# Patient Record
Sex: MALE | Race: BLACK OR AFRICAN AMERICAN | Employment: OTHER | URBAN - METROPOLITAN AREA
[De-identification: names, ages, dates, MRNs, and addresses within clinical notes are randomized per-mention and may not be internally consistent; named-entity substitution may affect disease eponyms.]

---

## 2017-10-03 PROBLEM — I99.9 VASCULOPATHY: Status: ACTIVE | Noted: 2017-10-03

## 2017-10-03 PROBLEM — N52.9 ERECTILE DYSFUNCTION: Status: ACTIVE | Noted: 2017-10-03

## 2017-10-03 PROBLEM — I10 ESSENTIAL HYPERTENSION: Status: ACTIVE | Noted: 2017-10-03

## 2017-10-03 PROBLEM — R31.9 HEMATURIA: Status: ACTIVE | Noted: 2017-10-03

## 2017-10-03 PROBLEM — I82.409 DVT (DEEP VENOUS THROMBOSIS) (HCC): Status: ACTIVE | Noted: 2017-10-03

## 2017-10-03 PROBLEM — Z95.828 S/P IVC FILTER: Status: ACTIVE | Noted: 2017-10-03

## 2017-10-03 PROBLEM — T30.4 BURNS BY, CHEMICAL: Status: ACTIVE | Noted: 2017-10-03

## 2019-12-05 NOTE — H&P (VIEW-ONLY)
Alexander Young MD, [address] [facility], [address] [place] Phone [phone]   Fax [phone] Date of visit: 12/5/2019 Primary/Requesting provider: Judson Browning MD 
 
Chief Complaint Patient presents with  New Patient  
  left inguinal hernia HISTORY OF PRESENT ILLNESS Ravi Calderon is a 71 y.o. male. HPI Patient is a 71 y.o. male who presents for evaluation of a hernia. He noted this hernia about 5+ months ago. He denies any tenderness in the groin, but notes a definite bulge. This seems to be reducible when he lays down, and worsens/enlarges with increasing activity. He denies nausea, vomiting, alteration of bowel function, or any urinary symptoms. He is very interested in repair. Medications:  
Current Outpatient Medications Medication Sig  
 amLODIPine (NORVASC) 10 mg tablet TAKE ONE TABLET BY MOUTH AT BEDTIME  multivitamin (ONE A DAY) tablet Take 1 Tab by mouth daily.  ferrous sulfate (IRON) 325 mg (65 mg iron) tablet Take  by mouth Daily (before breakfast).  bisoprolol-hydroCHLOROthiazide (ZIAC) 5-6.25 mg per tablet Take 1 Tab by mouth daily. No current facility-administered medications for this visit. Allergies: No Known Allergies Past History: 
Past Medical History:  
Diagnosis Date  Anemia  Hypertension  Vitamin D deficiency Past Surgical History:  
Procedure Laterality Date  HX COLONOSCOPY    
 HX FREE SKIN GRAFT    
 HX HERNIA REPAIR Family and Social History: 
Family History Problem Relation Age of Onset  No Known Problems Mother  Diabetes Father  Hypertension Father Social History Socioeconomic History  Marital status: SINGLE Spouse name: Not on file  Number of children: Not on file  Years of education: Not on file  Highest education level: Not on file Occupational History  Not on file Social Needs  Financial resource strain: Not on file  Food insecurity:  
  Worry: Not on file Inability: Not on file  Transportation needs:  
  Medical: Not on file Non-medical: Not on file Tobacco Use  Smoking status: Former Smoker Last attempt to quit: 10/3/1981 Years since quittin.1  Smokeless tobacco: Never Used Substance and Sexual Activity  Alcohol use: Not on file  Drug use: No  
 Sexual activity: Not on file Lifestyle  Physical activity:  
  Days per week: Not on file Minutes per session: Not on file  Stress: Not on file Relationships  Social connections:  
  Talks on phone: Not on file Gets together: Not on file Attends [employer] service: Not on file Active member of club or organization: Not on file Attends meetings of clubs or organizations: Not on file Relationship status: Not on file  Intimate partner violence:  
  Fear of current or ex partner: Not on file Emotionally abused: Not on file Physically abused: Not on file Forced sexual activity: Not on file Other Topics Concern  Not on file Social History Narrative  Not on file Review of Systems Constitutional: Negative. HENT: Negative. Eyes: Negative. Respiratory: Negative. Cardiovascular: Negative. Gastrointestinal: Negative. Genitourinary: Negative. Musculoskeletal: Negative. Skin:  
     Left inguinal hernia Neurological: Negative. Endo/Heme/Allergies: Negative. Psychiatric/Behavioral: Negative. Physical Exam 
Vitals signs and nursing note reviewed. Constitutional:   
   General: He is not in acute distress. Appearance: Normal appearance. He is well-developed. He is not toxic-appearing or diaphoretic. HENT:  
   Head: Normocephalic and atraumatic. Eyes:  
   General: No scleral icterus. Conjunctiva/sclera: Conjunctivae normal.  
   Pupils: Pupils are equal, round, and reactive to light. Neck: Musculoskeletal: Normal range of motion. Thyroid: No thyromegaly. Vascular: No JVD. Trachea: No tracheal deviation. Cardiovascular:  
   Rate and Rhythm: Normal rate and regular rhythm. Heart sounds: No murmur. No friction rub. No gallop. Pulmonary:  
   Effort: Pulmonary effort is normal. No respiratory distress. Breath sounds: Normal breath sounds. No wheezing or rales. Abdominal:  
   General: Abdomen is flat. Bowel sounds are normal. There is no distension. Palpations: Abdomen is soft. There is no mass. Hernia: A hernia is present. Hernia is present in the left inguinal area. There is no hernia in the right inguinal area. Genitourinary: 
   Comments: [facility] Musculoskeletal:  
   Comments: No gross deformities Skin: 
   General: Skin is warm. Neurological:  
   Mental Status: He is alert. Cranial Nerves: No cranial nerve deficit. Psychiatric:     
   Mood and Affect: Mood normal.     
   Behavior: Behavior normal. Behavior is cooperative. Thought Content: Thought content normal.  
 
reducible Left inguinal hernia, No RIH 
 
ASSESSMENT and PLAN Encounter Diagnoses Name Primary?  Left inguinal hernia Yes Discussed risk of incarceration/strangulation event in inguinal hernias, at least those identified incidentally which may not apply here, as 1% annual risk at 5 years in recent study randomizing men to surgery or observation. The same study indicated a >60% incidence of elective conversion from observation arm to hernia repair due to symptoms by 10 years. Pt desires repair. Will proceed with left inguinal hernia repair with mesh. Technical details of the procedure are reviewed. Risks reviewed include risks of anesthesia, bleeding, infection, visceral injury, persistent post-operative pain, and hernia recurrence. All questions are answered.

## 2019-12-10 RX ORDER — CEFAZOLIN SODIUM 1 G/3ML
2-3 INJECTION, POWDER, FOR SOLUTION INTRAMUSCULAR; INTRAVENOUS
Status: CANCELLED | OUTPATIENT
Start: 2019-12-10 | End: 2019-12-10

## 2019-12-11 ENCOUNTER — ANESTHESIA EVENT (OUTPATIENT)
Dept: SURGERY | Age: 69
End: 2019-12-11
Payer: MEDICARE

## 2019-12-12 ENCOUNTER — ANESTHESIA (OUTPATIENT)
Dept: SURGERY | Age: 69
End: 2019-12-12
Payer: MEDICARE

## 2019-12-12 ENCOUNTER — HOSPITAL ENCOUNTER (OUTPATIENT)
Age: 69
Setting detail: OUTPATIENT SURGERY
Discharge: HOME | End: 2019-12-12
Attending: SURGERY | Admitting: SURGERY
Payer: MEDICARE

## 2019-12-12 VITALS
HEART RATE: 83 BPM | DIASTOLIC BLOOD PRESSURE: 75 MMHG | TEMPERATURE: 98.2 F | SYSTOLIC BLOOD PRESSURE: 148 MMHG | HEIGHT: 69 IN | BODY MASS INDEX: 33.41 KG/M2 | WEIGHT: 225.6 LBS | OXYGEN SATURATION: 95 % | RESPIRATION RATE: 20 BRPM

## 2019-12-12 LAB — POTASSIUM BLD-SCNC: 3.2 MMOL/L (ref 3.5–5.1)

## 2019-12-12 PROCEDURE — 74011000250 HC RX REV CODE- 250: Performed by: NURSE ANESTHETIST, CERTIFIED REGISTERED

## 2019-12-12 PROCEDURE — 74011000250 HC RX REV CODE- 250: Performed by: SURGERY

## 2019-12-12 PROCEDURE — 77030018836 HC SOL IRR NACL ICUM -A: Performed by: SURGERY

## 2019-12-12 PROCEDURE — 77030012411 HC DRN WND CARD -A: Performed by: SURGERY

## 2019-12-12 PROCEDURE — 77030037088 HC TUBE ENDOTRACH ORAL NSL COVD-A: Performed by: ANESTHESIOLOGY

## 2019-12-12 PROCEDURE — 77030002996 HC SUT SLK J&J -A: Performed by: SURGERY

## 2019-12-12 PROCEDURE — C1781 MESH (IMPLANTABLE): HCPCS | Performed by: SURGERY

## 2019-12-12 PROCEDURE — 76010000161 HC OR TIME 1 TO 1.5 HR INTENSV-TIER 1: Performed by: SURGERY

## 2019-12-12 PROCEDURE — 76210000020 HC REC RM PH II FIRST 0.5 HR: Performed by: SURGERY

## 2019-12-12 PROCEDURE — 76210000006 HC OR PH I REC 0.5 TO 1 HR: Performed by: SURGERY

## 2019-12-12 PROCEDURE — 77030039425 HC BLD LARYNG TRULITE DISP TELE -A: Performed by: ANESTHESIOLOGY

## 2019-12-12 PROCEDURE — 77030019908 HC STETH ESOPH SIMS -A: Performed by: ANESTHESIOLOGY

## 2019-12-12 PROCEDURE — 77030040922 HC BLNKT HYPOTHRM STRY -A: Performed by: ANESTHESIOLOGY

## 2019-12-12 PROCEDURE — 74011250636 HC RX REV CODE- 250/636: Performed by: SURGERY

## 2019-12-12 PROCEDURE — 77030002982 HC SUT POLYSRB J&J -A: Performed by: SURGERY

## 2019-12-12 PROCEDURE — 84132 ASSAY OF SERUM POTASSIUM: CPT

## 2019-12-12 PROCEDURE — 74011250637 HC RX REV CODE- 250/637: Performed by: ANESTHESIOLOGY

## 2019-12-12 PROCEDURE — 77030002986 HC SUT PROL J&J -A: Performed by: SURGERY

## 2019-12-12 PROCEDURE — 77030031139 HC SUT VCRL2 J&J -A: Performed by: SURGERY

## 2019-12-12 PROCEDURE — 76060000033 HC ANESTHESIA 1 TO 1.5 HR: Performed by: SURGERY

## 2019-12-12 PROCEDURE — 77030040361 HC SLV COMPR DVT MDII -B: Performed by: SURGERY

## 2019-12-12 PROCEDURE — 74011250636 HC RX REV CODE- 250/636: Performed by: ANESTHESIOLOGY

## 2019-12-12 PROCEDURE — 74011250636 HC RX REV CODE- 250/636: Performed by: NURSE ANESTHETIST, CERTIFIED REGISTERED

## 2019-12-12 DEVICE — MESH HERN PLG PERFIX MED --: Type: IMPLANTABLE DEVICE | Site: INGUINAL | Status: FUNCTIONAL

## 2019-12-12 RX ORDER — OXYCODONE HYDROCHLORIDE 5 MG/1
5 TABLET ORAL
Status: DISCONTINUED | OUTPATIENT
Start: 2019-12-12 | End: 2019-12-12 | Stop reason: HOSPADM

## 2019-12-12 RX ORDER — NEOSTIGMINE METHYLSULFATE 1 MG/ML
INJECTION, SOLUTION INTRAVENOUS AS NEEDED
Status: DISCONTINUED | OUTPATIENT
Start: 2019-12-12 | End: 2019-12-12 | Stop reason: HOSPADM

## 2019-12-12 RX ORDER — EPHEDRINE SULFATE/0.9% NACL/PF 50 MG/5 ML
SYRINGE (ML) INTRAVENOUS AS NEEDED
Status: DISCONTINUED | OUTPATIENT
Start: 2019-12-12 | End: 2019-12-12 | Stop reason: HOSPADM

## 2019-12-12 RX ORDER — CEFAZOLIN SODIUM/WATER 2 G/20 ML
2 SYRINGE (ML) INTRAVENOUS
Status: COMPLETED | OUTPATIENT
Start: 2019-12-12 | End: 2019-12-12

## 2019-12-12 RX ORDER — GLYCOPYRROLATE 0.2 MG/ML
INJECTION INTRAMUSCULAR; INTRAVENOUS AS NEEDED
Status: DISCONTINUED | OUTPATIENT
Start: 2019-12-12 | End: 2019-12-12 | Stop reason: HOSPADM

## 2019-12-12 RX ORDER — MIDAZOLAM HYDROCHLORIDE 1 MG/ML
2 INJECTION, SOLUTION INTRAMUSCULAR; INTRAVENOUS
Status: DISCONTINUED | OUTPATIENT
Start: 2019-12-12 | End: 2019-12-12 | Stop reason: HOSPADM

## 2019-12-12 RX ORDER — LIDOCAINE HYDROCHLORIDE 20 MG/ML
INJECTION, SOLUTION EPIDURAL; INFILTRATION; INTRACAUDAL; PERINEURAL AS NEEDED
Status: DISCONTINUED | OUTPATIENT
Start: 2019-12-12 | End: 2019-12-12 | Stop reason: HOSPADM

## 2019-12-12 RX ORDER — FENTANYL CITRATE 50 UG/ML
INJECTION, SOLUTION INTRAMUSCULAR; INTRAVENOUS AS NEEDED
Status: DISCONTINUED | OUTPATIENT
Start: 2019-12-12 | End: 2019-12-12 | Stop reason: HOSPADM

## 2019-12-12 RX ORDER — HYDROCODONE BITARTRATE AND ACETAMINOPHEN 5; 325 MG/1; MG/1
2 TABLET ORAL AS NEEDED
Status: DISCONTINUED | OUTPATIENT
Start: 2019-12-12 | End: 2019-12-12 | Stop reason: HOSPADM

## 2019-12-12 RX ORDER — FENTANYL CITRATE 50 UG/ML
100 INJECTION, SOLUTION INTRAMUSCULAR; INTRAVENOUS ONCE
Status: DISCONTINUED | OUTPATIENT
Start: 2019-12-12 | End: 2019-12-12 | Stop reason: HOSPADM

## 2019-12-12 RX ORDER — KETOROLAC TROMETHAMINE 30 MG/ML
INJECTION, SOLUTION INTRAMUSCULAR; INTRAVENOUS AS NEEDED
Status: DISCONTINUED | OUTPATIENT
Start: 2019-12-12 | End: 2019-12-12 | Stop reason: HOSPADM

## 2019-12-12 RX ORDER — PROPOFOL 10 MG/ML
INJECTION, EMULSION INTRAVENOUS AS NEEDED
Status: DISCONTINUED | OUTPATIENT
Start: 2019-12-12 | End: 2019-12-12 | Stop reason: HOSPADM

## 2019-12-12 RX ORDER — ROCURONIUM BROMIDE 10 MG/ML
INJECTION, SOLUTION INTRAVENOUS AS NEEDED
Status: DISCONTINUED | OUTPATIENT
Start: 2019-12-12 | End: 2019-12-12 | Stop reason: HOSPADM

## 2019-12-12 RX ORDER — ONDANSETRON 2 MG/ML
INJECTION INTRAMUSCULAR; INTRAVENOUS AS NEEDED
Status: DISCONTINUED | OUTPATIENT
Start: 2019-12-12 | End: 2019-12-12 | Stop reason: HOSPADM

## 2019-12-12 RX ORDER — HYDROMORPHONE HYDROCHLORIDE 2 MG/ML
0.5 INJECTION, SOLUTION INTRAMUSCULAR; INTRAVENOUS; SUBCUTANEOUS
Status: DISCONTINUED | OUTPATIENT
Start: 2019-12-12 | End: 2019-12-12 | Stop reason: HOSPADM

## 2019-12-12 RX ORDER — SODIUM CHLORIDE, SODIUM LACTATE, POTASSIUM CHLORIDE, CALCIUM CHLORIDE 600; 310; 30; 20 MG/100ML; MG/100ML; MG/100ML; MG/100ML
75 INJECTION, SOLUTION INTRAVENOUS CONTINUOUS
Status: DISCONTINUED | OUTPATIENT
Start: 2019-12-12 | End: 2019-12-12 | Stop reason: HOSPADM

## 2019-12-12 RX ORDER — LIDOCAINE HYDROCHLORIDE 10 MG/ML
0.1 INJECTION INFILTRATION; PERINEURAL AS NEEDED
Status: DISCONTINUED | OUTPATIENT
Start: 2019-12-12 | End: 2019-12-12 | Stop reason: HOSPADM

## 2019-12-12 RX ORDER — DEXAMETHASONE SODIUM PHOSPHATE 4 MG/ML
INJECTION, SOLUTION INTRA-ARTICULAR; INTRALESIONAL; INTRAMUSCULAR; INTRAVENOUS; SOFT TISSUE AS NEEDED
Status: DISCONTINUED | OUTPATIENT
Start: 2019-12-12 | End: 2019-12-12 | Stop reason: HOSPADM

## 2019-12-12 RX ORDER — BUPIVACAINE HYDROCHLORIDE AND EPINEPHRINE 2.5; 5 MG/ML; UG/ML
INJECTION, SOLUTION EPIDURAL; INFILTRATION; INTRACAUDAL; PERINEURAL AS NEEDED
Status: DISCONTINUED | OUTPATIENT
Start: 2019-12-12 | End: 2019-12-12 | Stop reason: HOSPADM

## 2019-12-12 RX ADMIN — PROPOFOL 200 MG: 10 INJECTION, EMULSION INTRAVENOUS at 16:07

## 2019-12-12 RX ADMIN — Medication 10 MG: at 16:35

## 2019-12-12 RX ADMIN — LIDOCAINE HYDROCHLORIDE 60 MG: 20 INJECTION, SOLUTION EPIDURAL; INFILTRATION; INTRACAUDAL; PERINEURAL at 16:07

## 2019-12-12 RX ADMIN — ROCURONIUM BROMIDE 40 MG: 10 INJECTION, SOLUTION INTRAVENOUS at 16:07

## 2019-12-12 RX ADMIN — FENTANYL CITRATE 100 MCG: 50 INJECTION INTRAMUSCULAR; INTRAVENOUS at 16:07

## 2019-12-12 RX ADMIN — Medication 3 MG: at 17:07

## 2019-12-12 RX ADMIN — Medication 2 G: at 16:22

## 2019-12-12 RX ADMIN — SODIUM CHLORIDE, SODIUM LACTATE, POTASSIUM CHLORIDE, AND CALCIUM CHLORIDE 75 ML/HR: 600; 310; 30; 20 INJECTION, SOLUTION INTRAVENOUS at 14:02

## 2019-12-12 RX ADMIN — DEXAMETHASONE SODIUM PHOSPHATE 4 MG: 4 INJECTION, SOLUTION INTRAMUSCULAR; INTRAVENOUS at 16:35

## 2019-12-12 RX ADMIN — HYDROCODONE BITARTRATE AND ACETAMINOPHEN 2 TABLET: 5; 325 TABLET ORAL at 17:46

## 2019-12-12 RX ADMIN — ONDANSETRON 4 MG: 2 INJECTION INTRAMUSCULAR; INTRAVENOUS at 16:35

## 2019-12-12 RX ADMIN — KETOROLAC TROMETHAMINE 30 MG: 30 INJECTION, SOLUTION INTRAMUSCULAR; INTRAVENOUS at 17:07

## 2019-12-12 RX ADMIN — GLYCOPYRROLATE 0.6 MG: 0.2 INJECTION, SOLUTION INTRAMUSCULAR; INTRAVENOUS at 17:07

## 2019-12-12 NOTE — ANESTHESIA PREPROCEDURE EVALUATION
Relevant Problems   No relevant active problems       Anesthetic History   No history of anesthetic complications            Review of Systems / Medical History  Patient summary reviewed and pertinent labs reviewed    Pulmonary  Within defined limits                 Neuro/Psych   Within defined limits           Cardiovascular    Hypertension: well controlled              Exercise tolerance: >4 METS     GI/Hepatic/Renal  Within defined limits              Endo/Other  Within defined limits           Other Findings              Physical Exam    Airway  Mallampati: II  TM Distance: 4 - 6 cm  Neck ROM: normal range of motion   Mouth opening: Normal     Cardiovascular  Regular rate and rhythm,  S1 and S2 normal,  no murmur, click, rub, or gallop             Dental         Pulmonary  Breath sounds clear to auscultation               Abdominal         Other Findings            Anesthetic Plan    ASA: 2  Anesthesia type: general          Induction: Intravenous  Anesthetic plan and risks discussed with: Patient and Family

## 2019-12-12 NOTE — INTERVAL H&P NOTE
H&P Update:  Ozzy Santoyo was seen and examined. History and physical has been reviewed. The patient has been examined.  There have been no significant clinical changes since the completion of the originally dated History and Physical.

## 2019-12-12 NOTE — DISCHARGE INSTRUCTIONS
Mary Giraldo M.D.  [phone]    Instructions following Hernia Repair:    ACTIVITY:   Try to take a few short walks with help around the house later today. It is very important to take short walks to avoid blood clots and pneumonia.  You may be light-headed or sleepy from anesthesia, so be careful going up and down stairs. Avoid any activity that involves lifting/pushing more than 30 pounds until your followup appointment  DIET:   Drink only clear, non-carbonated liquids when you first get home (sugar-free if you are diabetic), such as Gatorade, chicken broth, etc.   Later  you may resume a more normal diet, depending on how you feel    PAIN:   You will be given a prescription for pain medication.  Try to take the pain medication with food, even a few crackers.  You may also use Tylenol, Motrin, Advil, or Aleve instead of the prescription pain medication. Do no take Tylenol and the prescription pain medication within 6 hours of each other.  URINARY RETENTION: If you are unable to empty your bladder within 6 hours after returning home, please go to your nearest Emergency Department or Urgent Care for urinary catheterization. WOUND CARE:   You may shower the day after surgery, unless instructed otherwise.  It is not uncommon for the incisions to ooze or drain blood-tinged fluid. You may remove the clear dressing on the fifth postoperative day.  Incisions will sometimes develop redness around them, up to the size of a quarter, as well as a hard lumpy feel. If this redness continues to get larger, please call the office. FOLLOW UP:   Your follow-up appointment is usually made when your surgery is arranged. Please call the office if you are not sure of this appointment. CALL THE DOCTOR IF:   You have a temperature higher than 101.5° Fahrenheit for more than 6 hours.  You have severe nausea or vomiting.  You develop increasing redness or infection at the incision.      Continue home medications as previously prescribed. After general anesthesia or intravenous sedation, for 24 hours or while taking prescription Narcotics:  · Limit your activities  · A responsible adult needs to be with you for the next 24 hours  · Do not drive and operate hazardous machinery  · Do not make important personal or business decisions  · Do  not drink alcoholic beverages  · If you have not urinated within 8 hours after discharge, please contact your surgeon on call. · If you have sleep apnea and have a CPAP machine, please use it for all naps and sleeping. · Please use caution when taking narcotics and any of your home medications that may cause drowsiness. *  Please give a list of your current medications to your Primary Care Provider. *  Please update this list whenever your medications are discontinued, doses are      changed, or new medications (including over-the-counter products) are added. *  Please carry medication information at all times in case of emergency situations. These are general instructions for a healthy lifestyle:  No smoking/ No tobacco products/ Avoid exposure to second hand smoke  Surgeon General's Warning:  Quitting smoking now greatly reduces serious risk to your health. Obesity, smoking, and sedentary lifestyle greatly increases your risk for illness  A healthy diet, regular physical exercise & weight monitoring are important for maintaining a healthy lifestyle    You may be retaining fluid if you have a history of heart failure or if you experience any of the following symptoms:  Weight gain of 3 pounds or more overnight or 5 pounds in a week, increased swelling in our hands or feet or shortness of breath while lying flat in bed. Please call your doctor as soon as you notice any of these symptoms; do not wait until your next office visit.

## 2019-12-12 NOTE — OP NOTES
Operative Report    Patient: Paul Juárez MRN: [id number]     YOB: 1950  Age: 71 y.o. Sex: male       Date of Surgery: 12/12/2019     Preoperative Diagnosis: Left inguinal hernia [K40.90]     Postoperative Diagnosis: Left inguinal hernia [K40.90]   Spermatic cord mass    Procedure:  left Indirect Inguinal Hernia Repair with PerFix Plug   Excision of mass of spermatic cord    Anesthesia: General     EBL: minimal    Complications: none    Indications:  As outlined in History and Physical.    Procedure Details   Informed consent was obtained and the patient was brought to the operating room and placed on the table in a supine position. After successful induction of anesthesia, the groin was prepped and draped in the standard fashion. An incision was made in the suprainguinal region and was carried down through the subcutaneous tissues with cautery to the external oblique aponeurosis which was incised parallel to its fibers including opening through the external ring. two nerve(s) was/were identified in the area, both in unusual locations and were freed- the iliohypogastric nerve exited quite cephalad and the ilioinguinal nerve was noted adherent to the cord. Both were freed, dissected free laterally, and were avoided throughout the procedure. The cord structures were mobilized at the level of the pubic tubercle and isolated with a penrose drain. The cord was then dissected in its proximal third and an indirect sac was not encountered; a cord lipoma was identified. This lipoma was dissected from the surrounding cord structures with cautery, tracing it through and then reducing it through the defect into the preperitoneal space taking care to avoid injury to the vas deferens.   A medium PerFix plug was placed into the preperitoneal space through the internal ring and the inner petals of the plug were sutured to the surrounding tissue with 2-0 Prolene; this included the conjoined tendon above and the internal oblique muscle laterally. The direct space was examined and there was not  a defect identified. The patch was then placed on the floor of the inguinal canal and sutured in place with 2-0 Prolene to the pubic tubercle medially, the inguinal ligament inferiorly, the rectus fascia superiorly, and laterally the upper leaf was crossed over the lower leaf and sutured to the inguinal ligament to provide a valve-like reconstruction of the internal ring. The cord was then returned to its normal anatomic position. The wound was irrigated, made hemostatic as necessary with cautery, and infiltrated with local.  The external oblique was then reapproximated with 2-0 vicryl to recreate the external ring, Sully's fascia was closed with 3-0 Vicryl  and the skin with subcuticular 4-0 Vicryl. Steri-Strips, telfa, and a tegaderm dressing was applied. The patient tolerated the procedure well and was awakened from anesthesia and taken to recovery in satisfactory condition. Sponge, needle, and instrument counts were correct as reported to me.     Rochelle Morillo MD  December 12, 2019

## 2019-12-13 NOTE — ANESTHESIA POSTPROCEDURE EVALUATION
Procedure(s): HERNIA INGUINAL REPAIR LEFT WITH MESH.     general    Anesthesia Post Evaluation      Multimodal analgesia: multimodal analgesia used between 6 hours prior to anesthesia start to PACU discharge  Patient location during evaluation: PACU  Patient participation: complete - patient participated  Level of consciousness: awake and alert  Pain management: adequate  Airway patency: patent  Anesthetic complications: no  Cardiovascular status: acceptable and hemodynamically stable  Respiratory status: acceptable  Hydration status: acceptable        Vitals Value Taken Time   /75 12/12/2019  5:55 PM   Temp 36.8 °C (98.2 °F) 12/12/2019  5:50 PM   Pulse 85 12/12/2019  5:55 PM   Resp 20 12/12/2019  5:50 PM   SpO2 95 % 12/12/2019  5:55 PM

## 2020-10-02 PROBLEM — E66.01 SEVERE OBESITY (HCC): Status: ACTIVE | Noted: 2020-10-02

## (undated) DEVICE — MASTISOL ADHESIVE LIQ 2/3ML

## (undated) DEVICE — SURGICAL PROCEDURE PACK BASIC ST FRANCIS

## (undated) DEVICE — 3M™ TEGADERM™ TRANSPARENT FILM DRESSING FRAME STYLE, 1624W, 2-3/8 IN X 2-3/4 IN (6 CM X 7 CM), 100/CT 4CT/CASE: Brand: 3M™ TEGADERM™

## (undated) DEVICE — GARMENT,MEDLINE,DVT,INT,CALF,MED, GEN2: Brand: MEDLINE

## (undated) DEVICE — PENROSE DRAIN 12" X 1/4: Brand: CARDINAL HEALTH

## (undated) DEVICE — MINOR SPLIT GENERAL: Brand: MEDLINE INDUSTRIES, INC.

## (undated) DEVICE — SUTURE VCRL SZ 4-0 L27IN ABSRB UD L19MM PS-2 3/8 CIR PRIM J426H

## (undated) DEVICE — SUTURE PERMAHAND SZ 2-0 L12X18IN NONABSORBABLE BLK SILK A185H

## (undated) DEVICE — (D)STRIP SKN CLSR 0.5X4IN WHT --

## (undated) DEVICE — SUTURE VCRL SZ 2-0 L27IN ABSRB UD L26MM CT-2 1/2 CIR J269H

## (undated) DEVICE — DRAPE,LAP,ADOLESCENT,STERILE: Brand: MEDLINE

## (undated) DEVICE — REM POLYHESIVE ADULT PATIENT RETURN ELECTRODE: Brand: VALLEYLAB

## (undated) DEVICE — SHEET, T, LAPAROTOMY, STERILE: Brand: MEDLINE

## (undated) DEVICE — SUTURE VCRL SZ 3-0 L27IN ABSRB UD L26MM SH 1/2 CIR J416H

## (undated) DEVICE — SOLUTION IV 1000ML 0.9% SOD CHL

## (undated) DEVICE — SYR LR LCK 1ML GRAD NSAF 30ML --

## (undated) DEVICE — SPONGE: SPECIALTY PEANUT XR 100/CS: Brand: MEDICAL ACTION INDUSTRIES

## (undated) DEVICE — BUTTON SWITCH PENCIL BLADE ELECTRODE, HOLSTER: Brand: EDGE

## (undated) DEVICE — (D)PREP SKN CHLRAPRP APPL 26ML -- CONVERT TO ITEM 371833

## (undated) DEVICE — SUT PROL 2-0 30IN CT2 BLU --